# Patient Record
Sex: FEMALE | Race: WHITE | NOT HISPANIC OR LATINO | Employment: FULL TIME | URBAN - METROPOLITAN AREA
[De-identification: names, ages, dates, MRNs, and addresses within clinical notes are randomized per-mention and may not be internally consistent; named-entity substitution may affect disease eponyms.]

---

## 2018-12-03 ENCOUNTER — EH NON-PROVIDER (OUTPATIENT)
Dept: OCCUPATIONAL MEDICINE | Facility: CLINIC | Age: 38
End: 2018-12-03

## 2018-12-03 ENCOUNTER — NON-PROVIDER VISIT (OUTPATIENT)
Dept: OCCUPATIONAL MEDICINE | Facility: CLINIC | Age: 38
End: 2018-12-03

## 2018-12-03 DIAGNOSIS — Z02.1 PRE-EMPLOYMENT DRUG SCREENING: ICD-10-CM

## 2018-12-03 DIAGNOSIS — Z02.89 VISIT FOR OCCUPATIONAL HEALTH EXAMINATION: ICD-10-CM

## 2018-12-03 DIAGNOSIS — Z01.89 RESPIRATORY CLEARANCE EXAMINATION, ENCOUNTER FOR: ICD-10-CM

## 2018-12-03 LAB
AMP AMPHETAMINE: NORMAL
BAR BARBITURATES: NORMAL
BZO BENZODIAZEPINES: NORMAL
COC COCAINE: NORMAL
INT CON NEG: NORMAL
INT CON POS: NORMAL
MDMA ECSTASY: NORMAL
MET METHAMPHETAMINES: NORMAL
MTD METHADONE: NORMAL
OPI OPIATES: NORMAL
OXY OXYCODONE: NORMAL
PCP PHENCYCLIDINE: NORMAL
POC URINE DRUG SCREEN OCDRS: NORMAL
THC: NORMAL

## 2018-12-03 PROCEDURE — 80305 DRUG TEST PRSMV DIR OPT OBS: CPT | Performed by: PREVENTIVE MEDICINE

## 2018-12-03 PROCEDURE — 94375 RESPIRATORY FLOW VOLUME LOOP: CPT | Performed by: PREVENTIVE MEDICINE

## 2018-12-21 ENCOUNTER — APPOINTMENT (RX ONLY)
Dept: URBAN - NONMETROPOLITAN AREA CLINIC 1 | Facility: CLINIC | Age: 38
Setting detail: DERMATOLOGY
End: 2018-12-21

## 2018-12-21 DIAGNOSIS — L29.89 OTHER PRURITUS: ICD-10-CM

## 2018-12-21 DIAGNOSIS — L29.8 OTHER PRURITUS: ICD-10-CM

## 2018-12-21 DIAGNOSIS — R21 RASH AND OTHER NONSPECIFIC SKIN ERUPTION: ICD-10-CM

## 2018-12-21 PROBLEM — L20.84 INTRINSIC (ALLERGIC) ECZEMA: Status: ACTIVE | Noted: 2018-12-21

## 2018-12-21 PROCEDURE — ? COUNSELING

## 2018-12-21 PROCEDURE — 11100: CPT

## 2018-12-21 PROCEDURE — ? DIAGNOSIS COMMENT

## 2018-12-21 PROCEDURE — 99202 OFFICE O/P NEW SF 15 MIN: CPT | Mod: 25

## 2018-12-21 PROCEDURE — ? PRESCRIPTION

## 2018-12-21 PROCEDURE — ? BIOPSY BY PUNCH METHOD

## 2018-12-21 PROCEDURE — ? TREATMENT REGIMEN

## 2018-12-21 RX ORDER — TRIAMCINOLONE ACETONIDE 1 MG/G
CREAM TOPICAL BID
Qty: 1 | Refills: 3 | Status: ERX | COMMUNITY
Start: 2018-12-21

## 2018-12-21 RX ORDER — PREDNISONE 20 MG/1
TABLET ORAL
Qty: 30 | Refills: 0 | Status: ERX | COMMUNITY
Start: 2018-12-21

## 2018-12-21 RX ADMIN — PREDNISONE 1: 20 TABLET ORAL at 00:00

## 2018-12-21 RX ADMIN — TRIAMCINOLONE ACETONIDE 1: 1 CREAM TOPICAL at 00:00

## 2018-12-21 ASSESSMENT — LOCATION SIMPLE DESCRIPTION DERM
LOCATION SIMPLE: LEFT THIGH
LOCATION SIMPLE: CHEST
LOCATION SIMPLE: ABDOMEN

## 2018-12-21 ASSESSMENT — LOCATION DETAILED DESCRIPTION DERM
LOCATION DETAILED: LEFT ANTERIOR PROXIMAL THIGH
LOCATION DETAILED: RIGHT MEDIAL SUPERIOR CHEST
LOCATION DETAILED: PERIUMBILICAL SKIN

## 2018-12-21 ASSESSMENT — LOCATION ZONE DERM
LOCATION ZONE: TRUNK
LOCATION ZONE: LEG

## 2018-12-21 NOTE — HPI: RASH
What Type Of Note Output Would You Prefer (Optional)?: Standard Output
How Severe Is Your Rash?: moderate
Is This A New Presentation, Or A Follow-Up?: Rash
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   She moved here this month from [place]  to work at [employer].  She developed lip cellulitis after scuba diving in [facility] that resolved with Keflex 2 months ago. She began getting a rash on her L upper chest prior to taking the antibiotics.  While she was driving from [place] to here, her rash worsened and she went to the ER 12/1/2018 where she was given epinephirine and a prescription for oral steroids for 5 day.  The rash improved, but recurred when she finished the steroids.  He has been taking oral antihistamines and using Dr. Woodruff soap.

## 2018-12-21 NOTE — PROCEDURE: DIAGNOSIS COMMENT
Comment: Ordered CHRIS screen.  Patient had a recent CBC and CMP that she will get sent here.
Detail Level: Detailed

## 2018-12-21 NOTE — PROCEDURE: BIOPSY BY PUNCH METHOD
Biopsy Type: H and E
Was A Bandage Applied: Yes
Bill For Surgical Tray: no
Epidermal Sutures: 5-0 Chromic Gut
X Depth Of Punch In Cm (Optional): 0
Hemostasis: Gelfoam
Post-Care Instructions: I reviewed with the patient in detail post-care instructions. Patient is to keep the biopsy site dry overnight, and then apply Polysporin twice daily until healed. Patient may apply hydrogen peroxide soaks to remove any crusting.
Consent: Written consent was obtained and risks were reviewed including but not limited to scarring, infection, bleeding, scabbing, incomplete removal, nerve damage and allergy to anesthesia.
Lab: [id number]
Home Suture Removal Text: Patient was provided a home suture removal kit and will remove their sutures at home.  If they have any questions or difficulties they will call the office.
Dressing: bandage
Lab Facility: [id number]
Anesthesia Type: 1% lidocaine with epinephrine and a 1:10 solution of 8.4% sodium bicarbonate
Notification Instructions: Patient will be notified of biopsy results. However, patient instructed to call the office if not contacted within 2 weeks.
Punch Size In Mm: 4
Detail Level: Simple
Billing Type: Third-Party Bill
Anesthesia Volume In Cc: 1
Wound Care: Vaseline

## 2019-01-09 ENCOUNTER — APPOINTMENT (RX ONLY)
Dept: URBAN - NONMETROPOLITAN AREA CLINIC 1 | Facility: CLINIC | Age: 39
Setting detail: DERMATOLOGY
End: 2019-01-09

## 2019-01-09 DIAGNOSIS — L29.89 OTHER PRURITUS: ICD-10-CM | Status: RESOLVING

## 2019-01-09 DIAGNOSIS — L29.8 OTHER PRURITUS: ICD-10-CM | Status: RESOLVING

## 2019-01-09 PROCEDURE — 99212 OFFICE O/P EST SF 10 MIN: CPT

## 2019-01-09 PROCEDURE — ? COUNSELING

## 2019-01-09 PROCEDURE — ? TREATMENT REGIMEN

## 2019-01-09 ASSESSMENT — LOCATION SIMPLE DESCRIPTION DERM: LOCATION SIMPLE: CHEST

## 2019-01-09 ASSESSMENT — LOCATION ZONE DERM: LOCATION ZONE: TRUNK

## 2019-01-09 ASSESSMENT — LOCATION DETAILED DESCRIPTION DERM: LOCATION DETAILED: LEFT MEDIAL SUPERIOR CHEST

## 2019-01-09 NOTE — PROCEDURE: TREATMENT REGIMEN
Detail Level: Zone
Plan: Consider allergy testing if rash recurs.
Continue Regimen: prednisone taper until finished and triamcinolone cream twice daily as needed